# Patient Record
Sex: FEMALE | Race: WHITE | NOT HISPANIC OR LATINO | URBAN - NONMETROPOLITAN AREA
[De-identification: names, ages, dates, MRNs, and addresses within clinical notes are randomized per-mention and may not be internally consistent; named-entity substitution may affect disease eponyms.]

---

## 2020-01-24 ENCOUNTER — IMPORTED ENCOUNTER (OUTPATIENT)
Dept: URBAN - NONMETROPOLITAN AREA CLINIC 1 | Facility: CLINIC | Age: 60
End: 2020-01-24

## 2020-01-24 PROBLEM — Z96.1: Noted: 2020-01-24

## 2020-01-24 PROBLEM — H11.152: Noted: 2020-01-24

## 2020-01-24 PROBLEM — H26.491: Noted: 2020-01-24

## 2020-01-24 PROBLEM — H52.03: Noted: 2020-01-24

## 2020-01-24 PROBLEM — H52.4: Noted: 2020-01-24

## 2020-01-24 PROCEDURE — 92004 COMPRE OPH EXAM NEW PT 1/>: CPT

## 2020-01-24 PROCEDURE — 92015 DETERMINE REFRACTIVE STATE: CPT

## 2020-01-24 NOTE — PATIENT DISCUSSION
Simple Hyperopia OU w/Presbyopia-  discussed findings w/patient-  new spectacle Rx issued-  monitor yearly or prnPseudophakia OU w/PCO OD-  discussed findings w/patient-  some PCO noted OD no treatment indicated-  s/p YAG PC OS open capsules noted -  monitor yearly or prnPingueculum OS-  discussed findings w/patient-  explained these are common in the [facility] [place] from sun exposure. -  no treatment indicated at this time  -  monitor as scheduled or prn; 's Notes: MR 1/24/2020DEF 1/24/2020

## 2020-02-20 ENCOUNTER — IMPORTED ENCOUNTER (OUTPATIENT)
Dept: URBAN - NONMETROPOLITAN AREA CLINIC 1 | Facility: CLINIC | Age: 60
End: 2020-02-20

## 2020-02-20 NOTE — PATIENT DISCUSSION
Rx Check-  discussed findings w/patient-  extremely small amount of change-  explained to patient that PCO is likely the cause of some of the reason that OD is as sharp as she would like-  new spectacle Rx issued -  continue to monitor as scheduled or prn; 's Notes: MR 1/24/2020DEF 1/24/2020

## 2021-01-29 ENCOUNTER — IMPORTED ENCOUNTER (OUTPATIENT)
Dept: URBAN - NONMETROPOLITAN AREA CLINIC 1 | Facility: CLINIC | Age: 61
End: 2021-01-29

## 2021-01-29 PROCEDURE — 92015 DETERMINE REFRACTIVE STATE: CPT

## 2021-01-29 PROCEDURE — 92014 COMPRE OPH EXAM EST PT 1/>: CPT

## 2021-01-29 NOTE — PATIENT DISCUSSION
Simple Hyperopia OU w/Presbyopia-  discussed findings w/patient-  new spectacle Rx issued-  continue to monitor yearly or prnPseudophakia w/PCO OD & Open Capsule OS-  discussed findings w/patient-  open capsule stable OS-  mild PCO OD-  continue to montor yearly or prnPVD OU-  discussed findings w/patient-  Retina flat 360 with no breaks tears or heme. -  S&S of RD/RT reviewed with pt. -  Stressed that pt should contact our office right away with any changes or increase in symptoms.-  RTC 1 year or prn; 's Notes: MR 1/29/2021DEF 1/29/2021

## 2021-01-31 PROBLEM — H52.4: Noted: 2020-01-24

## 2021-01-31 PROBLEM — H26.491: Noted: 2020-01-24

## 2021-01-31 PROBLEM — H43.813: Noted: 2021-01-31

## 2021-01-31 PROBLEM — H52.03: Noted: 2020-01-24

## 2021-01-31 PROBLEM — Z96.1: Noted: 2020-01-24

## 2022-01-30 ENCOUNTER — PREPPED CHART (OUTPATIENT)
Dept: URBAN - NONMETROPOLITAN AREA CLINIC 4 | Facility: CLINIC | Age: 62
End: 2022-01-30

## 2022-01-31 ENCOUNTER — COMPREHENSIVE EXAM (OUTPATIENT)
Dept: URBAN - NONMETROPOLITAN AREA CLINIC 4 | Facility: CLINIC | Age: 62
End: 2022-01-31

## 2022-01-31 DIAGNOSIS — H52.03: ICD-10-CM

## 2022-01-31 PROCEDURE — 92015 DETERMINE REFRACTIVE STATE: CPT

## 2022-01-31 PROCEDURE — 92014 COMPRE OPH EXAM EST PT 1/>: CPT

## 2022-01-31 ASSESSMENT — VISUAL ACUITY
OS_CC: 20/25-2
OD_CC: 20/20

## 2022-01-31 ASSESSMENT — TONOMETRY
OD_IOP_MMHG: 15
OS_IOP_MMHG: 15

## 2022-01-31 NOTE — PATIENT DISCUSSION
Patient is bothered by the worsening her superior field of vision.  Will refer patient to [address] [place] [address] for Ptosis Eval.

## 2022-04-15 ASSESSMENT — VISUAL ACUITY
OD_SC: 20/20
OD_SC: 20/20
OS_SC: 20/20-1
OU_CC: 20/20
OU_SC: 20/20
OU_SC: 20/20-2
OD_SC: 20/20
OS_SC: 20/20
OS_SC: 20/20-
OU_CC: 20/20 BLURRY

## 2022-04-15 ASSESSMENT — TONOMETRY
OS_IOP_MMHG: 15
OD_IOP_MMHG: 14
OD_IOP_MMHG: 13
OS_IOP_MMHG: 13

## 2022-04-26 ENCOUNTER — CONSULTATION/EVALUATION (OUTPATIENT)
Dept: URBAN - NONMETROPOLITAN AREA CLINIC 4 | Facility: CLINIC | Age: 62
End: 2022-04-26

## 2022-04-26 DIAGNOSIS — H02.413: ICD-10-CM

## 2022-04-26 PROCEDURE — 99214 OFFICE O/P EST MOD 30 MIN: CPT

## 2022-04-26 ASSESSMENT — VISUAL ACUITY
OD_CC: 20/20
OS_CC: 20/25

## 2022-04-26 NOTE — PATIENT DISCUSSION
Discussed risks/benefits and alternatives to surgery. Patient to come in for external photos and Ptosis Vf taped and untapped. Disucssed to d/c all blood thinners or baby aspirin and her B12 and Vitamins. MRD1-1 BLF-14 (-)LAG Ou TBUT 13 secs OU. * pt is allergic to Keflex*.

## 2022-11-09 ENCOUNTER — CLINIC PROCEDURE ONLY (OUTPATIENT)
Dept: URBAN - NONMETROPOLITAN AREA CLINIC 4 | Facility: CLINIC | Age: 62
End: 2022-11-09

## 2022-11-09 DIAGNOSIS — H02.413: ICD-10-CM

## 2022-11-09 PROCEDURE — 67904 REPAIR EYELID DEFECT: CPT

## 2022-11-09 NOTE — PROCEDURE NOTE: CLINICAL
PROCEDURE NOTE: Ptosis Repair Procedure:                                       Repair of Ptosis, Right upper eyelid 			                       				            Pre- Operative Diagnosis:              Visually Significant Ptosis, Right upper eyelid                                                             Post- Operative Diagnosis:	          Same  Prior to surgery a list of risks and complications including but not limited to infection, bleeding, blindness, need for re-operation, as well as possible over or under correction were explained to patient and family and the patient agreed to proceed with the surgery. The patient was brought into the operating suite and given two drops of Tetracaine, OU. An adhesive patient return electrode was placed on the patient’s arm and wire connected to the [facility] unit. The eyelid area was then wiped with an alcohol swab. Attention was directed to the right upper eyelid and a caliper was used to measure the height of the lid crease from the upper lid margin. A marker was used to sumanth the area for the lid crease after surgery. A pinch technique was used to determine the amount of skin for excision; this was done in a conservative manner. A large amount of skin nasally was noted and was felt to be significant enough to limit superonasal visual field. A large W-plasty was then marked to allow removal of this large amount of skin at the medial canthal region superiorly. A surgical marker was then used to sumanth the remaining area of skin excision. An upsweep was made temporally for aesthetic purposes. The eyelid area was then injected with 2% lidocaine with Epinephrine subcutaneously at the upper eyelid and centrally down to the lash line. Gentle massage was given for approximately ten minutes to allow the anesthetic to diffuse. Patient was then prepped and draped in usual sterile manner. A mono polar cutting cautery with needle electrode was then used to incise the earlier marked area of skin excision and large W-plasty. Edmar scissors and 0.5 forceps were then used to remove the skin and the orbicularis muscle flap. Hemostasis was achieved using mono polar cautery with needle electrode. The Edmar scissors and 0.5 forceps were then used to dissect inferiorly at the central incision site. An elliptical portion of muscle and tissue was then removed at the inferior incision site to the tarsus. The tarsus was thus exposed and hemostasis was once again achieved using mono polar cautery. The levator and fat pads were then exposed using 0.5 forceps and Edmar scissors. An appropriate amount of fat sculpting was then performed using a straight Juliana hemostat and mono polar cutting cautery. Extreme care was used to ensure no bleeding into the fat pads. Also extreme care was used ensure no excess traction on the fat pads. The superior border of the tarsus centrally was then located with 0.5 forceps and a horizontal pass was made through the superior border half thickness with 6-0 silk. The patient was sat up and the lid height and contour were inspected and adjusted accordingly. The patient was asked to close his/her eyes and the absence of lagophthalmos was also ensured. The patient was then laid back down and the suture was tied down in a permanent manner. Excess orbicularis muscle was then excised using 0.5 forceps and mono polar cutting cautery. Mono polar cautery was used to achieve hemostasis. The lid crease was using 6-0 prolene suture in a running manner centeral to temporal, then central to nasal. The flap created by the large W-plasty was then rotated and tied down using four 6.0 prolene sutures in an interrupted manner. The skin area nasally was noted to lay flat with no redundant skin to obstruct superior nasal vision. The area was then once again inspected and found to be without active bleeding. Antibiotic ointment was then placed in the eye and over the incision site. The patient was instructed to put cold compress over eye for 24 hours and then apply heat QID along with ointment QID to the incision site and at bedtime. The patient was scheduled to follow up in one week or earlier if any complications or problems were noted. Procedure:                                       Repair of Ptosis, Left upper eyelid 			                        Pre- Operative Diagnosis:              Visually Significant Ptosis, Left upper eyelid                                                             Post- Operative Diagnosis:	          Same  Prior to surgery a list of risks and complications including but not limited to infection, bleeding, blindness, need for re-operation, as well as possible over or under correction were explained to patient and family and the patient agreed to proceed with the surgery. The patient was brought into the operating suite and given two drops of Tetracaine, OU. An adhesive patient return electrode was placed on the patient’s arm and wire connected to the [facility] unit. The eyelid area was then wiped with an alcohol swab. Attention was directed to the left upper eyelid and a caliper was used to measure the height of the lid crease from the upper lid margin. A marker was used to sumanth the area for the lid crease after surgery. A pinch technique was used to determine the amount of skin for excision; this was done in a conservative manner. A large amount of skin nasally was noted and was felt to be significant enough to limit superonasal visual field. A large W-plasty was then marked to allow removal of this large amount of skin at the medial canthal region superiorly. A surgical marker was then used to sumanth the remaining area of skin excision. An upsweep was made temporally for aesthetic purposes. The eyelid area was then injected with 2% lidocaine with Epinephrine subcutaneously at the upper eyelid and centrally down to the lash line. Gentle massage was given for approximately ten minutes to allow the anesthetic to diffuse. Patient was then prepped and draped in usual sterile manner. A mono polar cutting cautery with needle electrode was then used to incise the earlier marked area of skin excision and large W-plasty. Edmar scissors and 0.5 forceps were then used to remove the skin and the orbicularis muscle flap. Hemostasis was achieved using mono polar cautery with needle electrode. The Edmar scissors and 0.5 forceps were then used to dissect inferiorly at the central incision site. An elliptical portion of muscle and tissue was then removed at the inferior incision site to the tarsus. The tarsus was thus exposed and hemostasis was once again achieved using mono polar cautery. The levator and fat pads were then exposed using 0.5 forceps and Edmar scissors. An appropriate amount of fat sculpting was then performed using a straight Juliana hemostat and mono polar cutting cautery. Extreme care was used to ensure no bleeding into the fat pads. Also extreme care was used ensure no excess traction on the fat pads. The superior border of the tarsus centrally was then located with 0.5 forceps and a horizontal pass was made through the superior border half thickness with 6-0 silk. The patient was sat up and the lid height and contour were inspected and adjusted accordingly. The patient was asked to close his/her eyes and the absence of lagophthalmos was also ensured. The patient was then laid back down and the suture was tied down in a permanent manner. Excess orbicularis muscle was then excised using 0.5 forceps and mono polar cutting cautery. Mono polar cautery was used to achieve hemostasis. The lid crease was using 6-0 prolene suture in a running manner centeral to temporal, then central to nasal. The flap created by the large W-plasty was then rotated and tied down using four 6.0 prolene sutures in an interrupted manner. The skin area nasally was noted to lay flat with no redundant skin to obstruct superior nasal vision. The area was then once again inspected and found to be without active bleeding. Antibiotic ointment was then placed in the eye and over the incision site. The patient was instructed to put cold compress over eye for 24 hours and then apply heat QID along with ointment QID to the incision site and at bedtime. The patient was scheduled to follow up in one week or earlier if any complications or problems were noted. Cassandra Atkins

## 2022-11-22 ENCOUNTER — POST-OP (OUTPATIENT)
Dept: URBAN - NONMETROPOLITAN AREA CLINIC 4 | Facility: CLINIC | Age: 62
End: 2022-11-22

## 2022-11-22 DIAGNOSIS — Z98.890: ICD-10-CM

## 2022-11-22 PROCEDURE — 99024 POSTOP FOLLOW-UP VISIT: CPT

## 2022-11-22 ASSESSMENT — VISUAL ACUITY
OD_CC: 20/20
OS_CC: 20/25

## 2022-11-22 NOTE — PATIENT DISCUSSION
Discussed risks/benefits and alternatives to surgery. Patient to come in for external photos and Ptosis Vf taped and untapped. Disucssed to d/c all blood thinners or baby aspirin and her B12 and Vitamins. MRD1-1 BLF-14 (-)LAG Ou TBUT 13 secs OU. * pt is allergic to Keflex*. No

## 2023-04-03 ENCOUNTER — ESTABLISHED PATIENT (OUTPATIENT)
Dept: RURAL CLINIC 1 | Facility: CLINIC | Age: 63
End: 2023-04-03

## 2023-04-03 DIAGNOSIS — H52.4: ICD-10-CM

## 2023-04-03 DIAGNOSIS — Z96.1: ICD-10-CM

## 2023-04-03 DIAGNOSIS — Z98.890: ICD-10-CM

## 2023-04-03 PROCEDURE — 92014 COMPRE OPH EXAM EST PT 1/>: CPT

## 2023-04-03 PROCEDURE — 92015 DETERMINE REFRACTIVE STATE: CPT

## 2023-04-03 ASSESSMENT — VISUAL ACUITY
OD_CC: 20/20
OU_CC: 20/20
OS_CC: 20/20

## 2023-04-03 ASSESSMENT — TONOMETRY
OD_IOP_MMHG: 15
OS_IOP_MMHG: 15

## 2023-05-02 ENCOUNTER — ESTABLISHED PATIENT (OUTPATIENT)
Dept: URBAN - NONMETROPOLITAN AREA CLINIC 4 | Facility: CLINIC | Age: 63
End: 2023-05-02

## 2023-05-02 DIAGNOSIS — Z96.1: ICD-10-CM

## 2023-05-02 DIAGNOSIS — H52.4: ICD-10-CM

## 2023-05-02 PROCEDURE — 92015 DETERMINE REFRACTIVE STATE: CPT

## 2023-05-02 ASSESSMENT — VISUAL ACUITY
OS_CC: 20/20
OD_CC: 20/20

## 2024-04-04 ENCOUNTER — COMPREHENSIVE EXAM (OUTPATIENT)
Dept: URBAN - NONMETROPOLITAN AREA CLINIC 4 | Facility: CLINIC | Age: 64
End: 2024-04-04

## 2024-04-04 DIAGNOSIS — Z98.890: ICD-10-CM

## 2024-04-04 DIAGNOSIS — Z96.1: ICD-10-CM

## 2024-04-04 DIAGNOSIS — H52.4: ICD-10-CM

## 2024-04-04 PROCEDURE — 92014 COMPRE OPH EXAM EST PT 1/>: CPT

## 2024-04-04 PROCEDURE — 92015 DETERMINE REFRACTIVE STATE: CPT

## 2024-04-04 ASSESSMENT — TONOMETRY
OS_IOP_MMHG: 18
OD_IOP_MMHG: 17

## 2024-04-04 ASSESSMENT — VISUAL ACUITY
OU_CC: 20/20
OS_CC: 20/20
OD_CC: 20/20

## 2025-03-12 ENCOUNTER — COMPREHENSIVE EXAM (OUTPATIENT)
Age: 65
End: 2025-03-12

## 2025-03-12 DIAGNOSIS — Z96.1: ICD-10-CM

## 2025-03-12 DIAGNOSIS — Z98.890: ICD-10-CM

## 2025-03-12 DIAGNOSIS — H52.4: ICD-10-CM

## 2025-03-12 PROCEDURE — 92014 COMPRE OPH EXAM EST PT 1/>: CPT

## 2025-03-12 PROCEDURE — 92015 DETERMINE REFRACTIVE STATE: CPT
